# Patient Record
Sex: FEMALE | Race: WHITE | Employment: OTHER | URBAN - METROPOLITAN AREA
[De-identification: names, ages, dates, MRNs, and addresses within clinical notes are randomized per-mention and may not be internally consistent; named-entity substitution may affect disease eponyms.]

---

## 2021-03-18 ENCOUNTER — ACNE/ROSACEA (OUTPATIENT)
Dept: URBAN - METROPOLITAN AREA CLINIC 30 | Facility: CLINIC | Age: 31
Setting detail: DERMATOLOGY
End: 2021-03-18

## 2021-03-18 ENCOUNTER — RX ONLY (RX ONLY)
Age: 31
End: 2021-03-18

## 2021-03-18 DIAGNOSIS — D48.5 NEOPLASM OF UNCERTAIN BEHAVIOR OF SKIN: ICD-10-CM

## 2021-03-18 PROCEDURE — 99204 OFFICE O/P NEW MOD 45 MIN: CPT

## 2021-03-18 RX ORDER — DAPSONE 75 MG/G
1 APPLICATION GEL TOPICAL DAILY
Qty: 60 | Refills: 2
Start: 2021-03-18

## 2021-03-18 RX ORDER — SPIRONOLACTONE 50 MG/1
2 TABLET TABLET, FILM COATED ORAL NIGHTLY
Qty: 60 | Refills: 1
Start: 2021-03-18

## 2021-03-18 RX ORDER — TRETINOIN 0.25 MG/G
1 APPLICATION CREAM TOPICAL NIGHTLY
Qty: 45 | Refills: 2
Start: 2021-03-18

## 2021-04-02 ENCOUNTER — ACNE SURGERY (OUTPATIENT)
Dept: URBAN - METROPOLITAN AREA CLINIC 30 | Facility: CLINIC | Age: 31
Setting detail: DERMATOLOGY
End: 2021-04-02

## 2021-04-02 DIAGNOSIS — L70.0 ACNE VULGARIS: ICD-10-CM

## 2021-04-02 PROCEDURE — 10040 ACNE SURGERY: CPT

## 2021-04-23 ENCOUNTER — RASH (OUTPATIENT)
Dept: URBAN - METROPOLITAN AREA CLINIC 31 | Facility: CLINIC | Age: 31
Setting detail: DERMATOLOGY
End: 2021-04-23

## 2021-04-23 ENCOUNTER — RX ONLY (RX ONLY)
Age: 31
End: 2021-04-23

## 2021-04-23 DIAGNOSIS — Z41.9 ENCOUNTER FOR PROCEDURE FOR PURPOSES OTHER THAN REMEDYING HEALTH STATE, UNSPECIFIED: ICD-10-CM

## 2021-04-23 PROCEDURE — 99213 OFFICE O/P EST LOW 20 MIN: CPT

## 2021-04-23 RX ORDER — MOMETASONE FUROATE 1 MG/G
1 A SMALL AMOUNT CREAM TOPICAL TWICE A DAY
Qty: 15 | Refills: 0
Start: 2021-04-23

## 2021-05-26 ENCOUNTER — RX ONLY (RX ONLY)
Age: 31
End: 2021-05-26

## 2021-05-26 RX ORDER — SPIRONOLACTONE 50 MG/1
2 TABLET TABLET, FILM COATED ORAL NIGHTLY
Qty: 60 | Refills: 1
Start: 2021-05-26

## 2021-08-09 ENCOUNTER — RX ONLY (RX ONLY)
Age: 31
End: 2021-08-09

## 2021-08-09 RX ORDER — SPIRONOLACTONE 50 MG/1
2 TABLET TABLET, FILM COATED ORAL NIGHTLY
Qty: 60 | Refills: 1
Start: 2021-08-09

## 2021-08-09 RX ORDER — TRETINOIN 0.25 MG/G
1 A SMALL AMOUNT CREAM TOPICAL AT BEDTIME
Qty: 45 | Refills: 6
Start: 2021-08-09

## 2021-10-19 ENCOUNTER — RX ONLY (RX ONLY)
Age: 31
End: 2021-10-19

## 2021-10-19 RX ORDER — SPIRONOLACTONE 50 MG/1
TABLET, FILM COATED ORAL
Qty: 60 | Refills: 5
Start: 2021-10-19

## 2022-12-22 ENCOUNTER — APPOINTMENT (RX ONLY)
Dept: URBAN - METROPOLITAN AREA CLINIC 12 | Facility: CLINIC | Age: 32
Setting detail: DERMATOLOGY
End: 2022-12-22

## 2022-12-22 DIAGNOSIS — Z41.1 ENCOUNTER FOR COSMETIC SURGERY: ICD-10-CM

## 2022-12-22 PROCEDURE — ? PATIENT SPECIFIC COUNSELING

## 2022-12-22 PROCEDURE — ? BOTOX

## 2022-12-22 ASSESSMENT — LOCATION SIMPLE DESCRIPTION DERM: LOCATION SIMPLE: LEFT CHEEK

## 2022-12-22 ASSESSMENT — LOCATION ZONE DERM: LOCATION ZONE: FACE

## 2022-12-22 ASSESSMENT — LOCATION DETAILED DESCRIPTION DERM: LOCATION DETAILED: LEFT SUPERIOR LATERAL BUCCAL CHEEK

## 2022-12-22 NOTE — PROCEDURE: BOTOX
Additional Area 2 Units: 0
Map Statment: See attached map for complete details
Reconstitution Date: 12/22/2022
Postcare Instructions: Patient instructed to not lie down for 4 hours and limit physical activity for 24 hours. Patient instructed not to travel by airplane for 48 hours.
Show Price In Note?: yes
Administered By (Optional): Dr Gipson
Bill Summary Price Listed Below, Or Bill Total Of Units X Price Per Unit?: Bill #Units x Price Per Unit
Additional Area 1 Units: 10
Detail Level: Detailed
Lot #: [id number]
Additional Area 1 Location: crows feet
Expiration Date (Month Year): 02/28/2025
Glabellar Complex Units: 12.5
Price Per Unit In $ (Use Numbers Only, No Text Please.): 13
Consent: Written consent was obtained prior to the procedure. Risks, benefits, expectations and alternatives were discussed including, but not limited to, infection, bleeding, lid/brow ptosis, bruising, swelling, diplopia, temporary effects, incomplete chemical denervation and dissatisfaction with the cosmetic outcome. No guarantee or warranty was given or implied regarding longevity of results.
Dilution (U/0.1 Cc): 2.2